# Patient Record
(demographics unavailable — no encounter records)

---

## 2025-01-17 NOTE — ADDENDUM
[FreeTextEntry1] :  Documented by Ignacio Murillo acting as scribe for Dr. Melo on 01/17/2025. All Medical record entries made by the Scribe were at my, Dr. Melo, direction and personally dictated by me on 01/17/2025 . I have reviewed the chart and agree that the record accurately reflects my personal performance of the history, physical exam, assessment and plan. I have also personally directed, reviewed, and agreed with the discharge instructions.

## 2025-01-17 NOTE — PHYSICAL EXAM
[] : septum deviated bilaterally [Midline] : trachea located in midline position [Normal] : no rashes [Hearing Loss Right Only] : normal [Hearing Loss Left Only] : normal [FreeTextEntry8] : minimal cerumen removed via curettage. small ear canal [FreeTextEntry9] : cerumen removed via curettage and suction [de-identified] : mildly inflamed turbinates

## 2025-01-17 NOTE — DATA REVIEWED
[de-identified] : Audio 1/17/25: -Type A Tymp AU -Mild sloping to severe SNHL 250-8000 Hz AU - AS asymmetry noted @ 4634-6890 Hz -slight decrease mid-HF AU -72% discrimination AD and 76% discrimination AS at 95 dB [de-identified] : Compliance Report 10/15/24-1/13/25: -99% complaint -averages over 6 hours per night -AHI 3.7

## 2025-01-17 NOTE — HISTORY OF PRESENT ILLNESS
[de-identified] : Pt. with h/o bilateral SNHL- wear bilateral amplification ([employer]), intranasal synechiae, and right-sided epistaxis with Silver Nitrate cautery presents today for a follow up. He states that he has been using his CPAP machine. He denies recent epistaxis. He states that he has been using saline gel spray as needed. He states that he has constant tinnitus. He states that he still has some trouble hearing with his hearing aids in.

## 2025-01-17 NOTE — CONSULT LETTER
[Dear  ___] : Dear  [unfilled], [Courtesy Letter:] : I had the pleasure of seeing your patient, [unfilled], in my office today. [Please see my note below.] : Please see my note below. [Consult Closing:] : Thank you very much for allowing me to participate in the care of this patient.  If you have any questions, please do not hesitate to contact me. [Sincerely,] : Sincerely, [FreeTextEntry3] :  Carlo Melo MD FACS

## 2025-01-17 NOTE — ASSESSMENT
[FreeTextEntry1] : Reviewed and reconciled medications, allergies, PMHx, PSHx, SocHx, FMHx.  Pt. with h/o bilateral SNHL- wear bilateral amplification ([employer]), intranasal synechiae, and right-sided epistaxis with Silver Nitrate cautery presents today for a follow up. He states that he has been using his CPAP machine. He denies recent epistaxis. He states that he has been using saline gel spray as needed. He states that he has constant tinnitus. He states that he still has some trouble hearing with his hearing aids in.  Compliance Report 10/15/24-1/13/25: -99% complaint -averages over 6 hours per night -AHI 3.7  Audio 1/17/25: -Type A Tymp AU -Mild sloping to severe SNHL 250-8000 Hz AU - AS asymmetry noted @ 2921-3971 Hz -slight decrease mid-HF AU -72% discrimination AD and 76% discrimination AS at 95 dB  Physical exam: -right ear canal: minimal cerumen removed via curettage. small ear canal -left ear canal: cerumen removed via curettage and suction -slightly deviated septum -mildly inflamed turbinates  Plan: Compliance report reviewed - Patient is compliant and receiving significant improvement in sleep apnea with the use of a CPAP machine, which they need to continue using. -FU in 1 year

## 2025-01-30 NOTE — PHYSICAL EXAM
[Normal Appearance] : normal appearance [Well Groomed] : well groomed [General Appearance - In No Acute Distress] : no acute distress [Edema] : no peripheral edema [Respiration, Rhythm And Depth] : normal respiratory rhythm and effort [Exaggerated Use Of Accessory Muscles For Inspiration] : no accessory muscle use [Abdomen Soft] : soft [Abdomen Tenderness] : non-tender [Costovertebral Angle Tenderness] : no ~M costovertebral angle tenderness [Urinary Bladder Findings] : the bladder was normal on palpation [Normal Station and Gait] : the gait and station were normal for the patient's age [] : no rash [No Focal Deficits] : no focal deficits [Oriented To Time, Place, And Person] : oriented to person, place, and time [Mood] : the mood was normal [Affect] : the affect was normal [No Palpable Adenopathy] : no palpable adenopathy [Chaperone Present] : A chaperone was present in the examining room during all aspects of the physical examination [FreeTextEntry2] : maggy

## 2025-07-18 NOTE — PROCEDURE
[Complicated Symptoms] : complicated symptoms requiring more thorough examination than provided by mirror [None] : none [Flexible Endoscope] : examined with the flexible endoscope [de-identified] : Flexible Laryngoscopy: -uvula swelling -mild Edema of the postcricoid, arytenoids and interarytenoids. -epiglottis normal -airway is okay

## 2025-07-18 NOTE — PHYSICAL EXAM
[Hearing Loss Right Only] : diminished [Hearing Loss Left Only] : diminished [de-identified] : inflamed turbinates. Deviated septum [Midline] : trachea located in midline position [de-identified] : mild edema of the uvula  [Normal] : salivary glands are normal [FreeTextEntry2] : sensations intact. sinuses nontender to percussion

## 2025-07-18 NOTE — ASSESSMENT
[FreeTextEntry1] :  Reviewed and reconciled medications, allergies, PMHx, PSHx, SocHx, FMHx   Pt. with h/o bilateral SNHL- wear bilateral amplification ([employer]), intranasal synechiae, and right-sided epistaxis with Silver Nitrate cautery, sleep apnea - on CPAP, and tinnitus presents today for a 6 month follow up. Patient states he is getting new hearing aids soon. States he still has tinnitus. Patient states he just got back from [place] and the pollen was so bad there. He states having a raspy throat and swollen uvula - taking Zyrtec.  Compliance Report: 05/01/25 - 07/10/25: -100% complaint -averaging 8 ours a night -AHI: 7.3  Physical Exam: -deviated septum -synechiae -inflamed turbinates -mild edema of the uvula  Flexible Laryngoscopy: -uvula swelling -mild Edema of the postcricoid, arytenoids and interarytenoids. -epiglottis normal -airway is okay  Plan: Sent message to Joanna to have CPAP machine turned up to 7.  Methylprednisolone Prescribed. FU 6 months

## 2025-07-18 NOTE — HISTORY OF PRESENT ILLNESS
[de-identified] : Pt. with h/o bilateral SNHL- wear bilateral amplification ([employer]), intranasal synechiae, and right-sided epistaxis with Silver Nitrate cautery, sleep apnea - on CPAP, and tinnitus presents today for a 6 month follow up. Patient states he is getting new hearing aids soon. States he still has tinnitus. Patient states he just got back from [place] and the pollen was so bad there. He states having a raspy throat and swollen uvula - taking Zyrtec.

## 2025-07-18 NOTE — ADDENDUM
[FreeTextEntry1] : Documented by Molly Paloimno acting as scribe for Dr. Melo on 07/18/2025 All Medical record entries made by the Scribe were at my, Dr. Melo, direction and personally dictated by me on 07/18/2025  . I have reviewed the chart and agree that the record accurately reflects my personal performance of the history, physical exam, assessment and plan. I have also personally directed, reviewed, and agreed with the discharge instructions.